# Patient Record
(demographics unavailable — no encounter records)

---

## 2024-10-17 NOTE — ASSESSMENT
[FreeTextEntry1] : 3 year male with dysphagia, OME, and ATH with snoring.    Referral to GI for dysphagia. may have EoE and may need endoscopy.  Snoring and ATH on exam.  Discussed snoring vs UARS vs SDB vs ANDRZEJ.  Discussed that primary snoring is not harmful in and off itself but sleep apnea is different.  Often if we suspect SDB or ANDRZEJ, we recommend evaluating and treating due to long term risk of quality of life issues, learning issues and, in severe cases, heart and lung problems.  Given current SDB symptoms and patient otherwise healthy would recommend considering adenotonsillectomy.  Discussed ANDRZEJ and risks, alternatives, and benefits of adenotonsillectomy including observation or CPAP.  Risks of adenotonsillectomy discussed including, but not limited to, bleeding, infection, scarring, voice changes, pain, dehydration, persistence of sleep apnea, and regrowth of adenoids.  Briefly discussed risk of anesthesia but they will discuss more in depth with the anesthesiologist the day of the procedure.  Parent agreed to proceed to surgery and this will be scheduled accordingly.  History of ear fluid.  Discussed options including ear tubes versus observation and conservative therapy.  Discussed risks, benefits, and alternatives of ear tube placement including, but not limited to, bleeding, scarring, TM perforation, early extrusion, late extrusion, or need for further operation. We briefly discussed the risk of anesthesia. At this point family wishes to proceed with ear tube placement. Repeat audio after surgery.   Discussed at length that ear fluid itself is a result of a mechanical problem due to swelling and inflammation after URIs and that if not infected fluid that we often don't treat with antibiotics.  The underlying issues is eustachian tube dysfunction which can be transient in which we just wait for viral illnesses to run their course.  If the ETD is chronic that is when we discuss possible ear tubes.  Unfortunately there is no good evidence about medications to help improve transient ETD but some have tried nasal sprays including steroids and allergy meds.  Discussed that when they have ear fluid during a URI we recommend waiting 2-3 days and treat supportively and with tylenol or motrin. If the infections persists past that time, can consider oral abx.  Ear tubes in this setting simply bypass the eustachian tube allowing it time to improve function on its own.  The hope is that fewer ear infections and not needing oral abx for ear infections with ear tubes in place (just ear drops).  OME today and previously.  Audio with normal SF. continue feeding and speech therapy.    Would plan for T&A and BMT.  Will await GI eval to see if EGD indicated and coordinate.

## 2024-10-17 NOTE — PHYSICAL EXAM
Detail Level: Detailed [3+] : 3+ [Normal Gait and Station] : normal gait and station [Normal muscle strength, symmetry and tone of facial, head and neck musculature] : normal muscle strength, symmetry and tone of facial, head and neck musculature [Normal] : no cervical lymphadenopathy [Exposed Vessel] : left anterior vessel not exposed [Increased Work of Breathing] : no increased work of breathing with use of accessory muscles and retractions [de-identified] : retracted [de-identified] : OME

## 2024-10-17 NOTE — HISTORY OF PRESENT ILLNESS
[Snoring] : snoring [Tiredness/Fatigue] : tiredness/fatigue [Hyperactivity] : hyperactivity [Dysphagia] : dysphagia [Recurrent Ear Infections] : recurrent ear infections [No Personal or Family History of Easy Bruising, Bleeding, or Issues with General Anesthesia] : No Personal or Family History of easy bruising, bleeding, or issues with general anesthesia [de-identified] : 10-17-24 3 year M with dysphagia and speech delay Has CDE scheduled on Nov 11 Seen by Dr. Underwood who had previously discussed BMT and adenoidectomy due to ETD and SDB symptoms Has issues with choking on solids only, no issues with thins Will not tolerate meats only breads, gaining weight No history of PNA or hospitalizations for respiratory infections No prior GI evaluation  +Nasal congestion Tried nasal steroid without relief +Snoring, daytime tiredness, ADHD concerns, open mouth breathing, restless No prior PSG  2 ear infections in the last six months, most recent Aug 5 infections for the whole year No otorrhea Passed [facility] Getting speech therapy with good progress No recent throat infections No bleeding or anesthesia issues

## 2024-10-17 NOTE — DATA REVIEWED
[FreeTextEntry1] : Audiogram was ordered due to concerns for possible ETD I personally reviewed and interpreted the audiogram. I explained the results of the audiogram to the family. Tymps:  Type B/C Audio: SFs -2kHz, SDT 20

## 2024-11-11 NOTE — ASSESSMENT
[FreeTextEntry1] : PEDIATRIC CLINICAL SWALLOW EVALUATION  Type of Evaluation & Procedure Code: Evaluation of Oral and Pharyngeal Swallow CPT 42868   Patient Name: Dany Marshall  Date of Exam:2024  YOB: 2021  Referring Physician: Dr. Beverly Irizarry   Referring Physician Specialty: Otolaryngology   Medical Diagnosis: Dysphagia, unspecified type (R13.10)  Treatment Diagnosis: Oropharyngeal dysphagia (R13.12)  Date of onset: 6 months of age    REASON FOR REFERRAL:  Dany Marshall, a 3-year-old male, was referred for a Clinical Swallow Evaluation to assess oropharyngeal swallow function due to reported choking episodes, requiring the Heimlich maneuver with solid foods. Patient was accompanied to the evaluation by his mother and grandmother, who provided the case history information, and served as reliable informants.   Current feeding complaints reported by mother included:   -Presence of choking during feeding with solid foods  -Evidence of color change during feeding/while choking on solid foods.    BIRTH & MEDICAL HISTORY: Birth and Medical history gathered via parent interview and through review of electronic medical record. Patient was born at term via emergency  delivery due to fetal hypoxia.   Medical history is significant for developmental delay, chronic nasal congestion, dysphagia, and adenotonsillar hypertrophy. Patient is followed by GI and ENT. Per most recent ENT note dated 10/17/2024: "Epiglottis crisp, FVF and TVF without lesion. TVF fully mobile." No surgical history or hospitalizations reported.    Current Respiratory Status: Room Air  Medications: current medication use denied  Medical allergies: No known Medical allergies reported  Food allergies: No known food allergies reported  Food intolerances: No known food intolerances reported   THERAPEUTIC HISTORY:  Patient reportedly receives speech therapy 1x per week through school-based services.    Results of Previous Modified Barium Swallow Study (MBSS)/Videofluoroscopic Swallow Studies (VFSS):  None reported   Results of Previous Clinical swallow evaluations: None reported   FEEDING HISTORY:  Current Diet (based on the International Dysphagia Diet Standardization initiative [IDDSI]):  Exclusive oral feedings of age-appropriate solids and thin liquids   Feeding Method: Independent in self-feeding   Reported Endurance During Meals: Good   Feeding utensils: Straw, open cup, and milk from a bottle with standard nipple 2x per day  Feeding schedule: 3 meals per day and snacks between Temperature preference: No preference reported  Length of time taken to complete a feedin-20 minutes  History of feeding tube: None reported   Per report, patient is a "picky eater." Patient primarily prefers to eat pizza, bread, crackers, and a variety of fruits. Patient drinks from age-appropriate feeding utensils throughout the day, however drinks milk from a standard nipple 2x per day in the morning and at night. Mother has no concerns for liquids and denied recent upper respiratory infections/pneumonias.  Patient has required the Heimlich Maneuver multiple times while eating solids and turned blue.  Patient reportedly "chokes daily."    The Pediatric Eating Assessment Tool (PediEAT)   The Pediatric Eating Assessment Tool (PediEAT) was completed patient's caregiver to describe the child's typical eating patterns in a patient ranging in 6 months of age to 7 years of age who is being offered some solid foods. Scores are  obtained from four subscales: Physiologic Symptoms, Problematic Mealtime Behaviors, Selective/Restrictive Eating, Oral Processing yielding a Total Score with lower scores indicating lower skill and higher scores indicating higher skill. Scores are grouped in three categories <90th%=No Concern, 90th-95th%= Concern, and >95th%+ High Concern (Bob et al., 2014).  The PediEAT was utilized as an accompaniment to the clinical assessment completed by the provider today.     The PediEAT yielded the following:  Physiologic Symptoms: 34 (High concern)   Problematic Mealtime Behaviors: 54 (Concern)   Selective/Restrictive Eatin (No concern)   Oral Processin (No concern)   Total Score: 115 (High concern)    Interpretation: Caregiver responses of the PediEAT yielded high concern for patient's typical eating patterns.    ASSESSMENT:  Mental Status: Alert, responsive, and cooperative  Mobility Status: Ambulatory   POSTURAL CONTROL & MOVEMENT PATTERN:  Head Control: Within functional limits   Trunk Control: Within functional limits   Involuntary movement: Not observed      ORAL MOTOR ASSESSMENT:  A cursory oral mechanism examination of was conducted   Patient presented with facial symmetry and a predominantly closed mouth posture while at rest.   Dentition: Within functional limits for age   Oral Mucosa: Moist   Buccal: Within functional limits   Palate: Within functional limits    Velar function: intact  Labial: Adequate retraction, adequate lip rounding and closure appreciated   Lingual:  Adequate strength, tone and range of motion   Testing Tongue Position: Within functional limits  Oral Sensory: Within functional limits   Vocal Quality was perceptually judged to be within functional limits based on conversation   Gag reflex: At this time, clinician did not elicit gag reflex.   ASSESSMENT:  The patient was assessed sitting at a child's table  Patient's caregiver was present and they did not serve as the feeder during today's exam. Patient self-fed trials.   Respiratory Status during Evaluation: Room Air   Secretion Management: Within functional limits  There was no coughing, no throat clearing, and no wet/gurgly vocal quality prior to PO administration. Nasal congestion prior to PO trials. Per report, this is consistent with baseline.   The patient was alert and cooperative.   Feeding Assessment:    Solids Trials:  Consistencies Administered:  Pureed (Level 4) via teaspoon   Age-appropriate solids   Feeding Method: Self-fed   Endurance during meal/trials: Good   Oral Preparatory Stage for Solids:  Patient's oral preparatory phase was marked by adequate acceptance as he willingly self-fed trials. Age-appropriate spoon feeding skill appreciated for puree. Age appropriate mastication skill appreciated and marked by adequate bite, lateralization of bolus to molars and demonstration of rotary chew pattern. Cohesive bolus formation appreciated across all trials. Please note, patient attempted to place large bolus into oral cavity 1x;  self-removed following verbal cue from mother and clinician Per report, this is not consistent with baseline behavior. Mother stated patient rarely overstuffs his oral cavity.     Oral Phase for Solids:  Patient's oral phase was marked by coordinated oral motor movements. Adequate anterior posterior movement of bolus, timely oral transit time, and adequate clearance of bolus from oral cavity appreciated.    Pharyngeal Stage: Solids   Within functional limits: Yes   Initiation of swallow based on digital palpation and judged to be: Within functional limits   Hyo-laryngeal elevation based on digital palpation: Within functional limits   Overt signs and symptoms of penetration/aspiration: Not observed   Cardiopulmonary changes: NO cyanosis observed.   Fluid Trials:  Consistencies Administered:   Thin (Level 0) via small juice box straw and open cup   Feeding Method: Self-fed   Endurance during meal/trials: Good   Oral Preparatory Stage for Fluids  Patient's oral preparatory phase was marked by adequate acceptance, awareness of volume size and cohesive bolus formation.  Adequate oral grading to open cup appreciated. Age appropriate straw drinking skill marked by adequate ability to create and maintain intraoral gradient pressure for fluid expression upward in straw    Oral Stage for Fluids:  Patient's oral stage was marked by coordinated lingual movements as patient was noted with adequate control and containment of single and consecutive sips of thin liquids from both the straw and open cup. Adequate anterior posterior movement of bolus, timely oral transit time, and adequate clearance of bolus from oral cavity appreciated.    Pharyngeal Stage: Fluids  Within functional limits: Yes   Initiation of swallow based on digital palpation and judged to be: Within functional limits   Hyo-laryngeal elevation based on digital palpation: Within functional limits   Overt signs and symptoms of penetration/aspiration: Not observed  Cardiopulmonary Changes: Not observed   PROGNOSIS:  Prognosis is good for safe and adequate oral intake of the recommended consistencies as outlined below, based on the results of today's assessment and the medical history reported.   EDUCATION:   Discussed results of evaluation with patient's caregivers    Patient's caregivers expressed understanding of evaluation and agreement with goals and treatment plan  Patient's caregivers expressed understanding of safety precautions/feeding recommendations  Provided written recommendations   Provided soft and bite-sized (IDDSI Level 6) handout  Provided modified barium swallow study scheduling sheet    IMPRESSIONS: Dany Marshall, a 3-year-old male, was referred for a Clinical Swallow Evaluation to assess oropharyngeal swallow function due to reported choking episodes, requiring the Heimlich maneuver with solid foods. Patient with functional oral stage for liquids as he was noted with adequate control and containment of single and consecutive sips of thin liquids, however please note patient with immature feeding skill as he accepts milk from a bottle 2x per day.  For solid trials, patient noted with age-appropriate spoon feeding skill and mastication pattern. Adequate preparation, manipulation and oral clearance appreciated for age-appropriate solids.  Patient attempted to overstuff oral cavity 1x requiring cues to remove bolus from oral cavity. This was not replicated for trials in which patient demonstrated appropriate bite sizes. Parent counseled and educated on supervision with meal times, and importance of appropriate bolus sizes. Pharyngeal stage notable for timely pharyngeal swallow initiation and adequate hyolaryngeal elevation/excursion based on digital palpation. NO overt signs or symptoms of aspiration noted for thin liquids, puree, and age-appropriate solids. No cardiopulmonary changes observed. Vocal quality remained consistent pre- and post- trials based on conversation. Given parent report of choking with solid foods, and caregiver responses of the PediEAT qualitative questionnarie patient would benefit from soft and bite-sized diet with the following safe feeding strategies listed below, and modified barium swallow study to objectively assess pharyngeal stage.   DIET/FLUID RECOMMENDED CONSISTENCIES: Initiate soft and bite sized solids (Level 6) and continue thin liquids (Level 0)    Safety precautions swallowing/recommendations:  -Strict 1:1 supervision with mealtimes   -Verbal cues for small bites   ADDITIONAL RECOMMENDATIONS:  -Initiate feeding and swallowing therapy through school district/community based services addressing age appropriate feeding skills and reported picky eating. Services offered at this center, however parents declined with preference to seek therapeutic intervention closer to home.   -Modified Barium Swallow Study/Videofluoroscopic Swallow Study (MBS/VFSS) is recommended secondary to multiple choking episodes.   -Follow up with established providers as scheduled   -Monitor for signs and symptoms of aspiration and or laryngeal penetration, such as change of breathing pattern, cough, throat clearing, gurgly/wet voice, color change, fever, pneumonia, and upper respiratory infection. If noted: Discontinue oral intake, provide non-oral nutrition/hydration/meds, and contact physician immediately.   This referral process was reviewed with the parent. No further recommendations were made at this time. Please feel free to contact the Center at [phone], if any additional information is needed.   Jeannine Mullins M.A., CCC-SLP  [facility]#[id number]    References  RHEA Rojas., LADONNA Peck., WICHO Lindsey., ANITRA Tsai., KENISHA Stanley., BRAYAN Elkins., HUDSON Osorio, and DARYL Nicole. (2014). Development and content validation of the Pediatric Eating Assessment Tool (Pedi-Eat). [employer] Journal of Speech-Language pathology, 23, 1-14. Doi: [id number] ()

## 2024-11-11 NOTE — REASON FOR VISIT
[Initial] : initial visit for [Clinical Swallow] : clinical swallow evaluation [FreeTextEntry1] : Dr. Beverly Irizarry

## 2025-01-17 NOTE — HISTORY OF PRESENT ILLNESS
[Snoring] : snoring [Tiredness/Fatigue] : tiredness/fatigue [Hyperactivity] : hyperactivity [Dysphagia] : dysphagia [Recurrent Ear Infections] : recurrent ear infections [No Personal or Family History of Easy Bruising, Bleeding, or Issues with General Anesthesia] : No Personal or Family History of easy bruising, bleeding, or issues with general anesthesia [de-identified] : 1-17-25 speech eval 11/11/24 - currently in speech therapy Complaining of right otalgia - he's often sticking his fingers inside No drainage or fevers URI 2 weeks ago No concerns for hearing loss Swallowing has been stable, gaining weight. not in feeding therapy. choking on solids not liquids. Pending EGD by GI.  Choking less than last visit but does continue to happen with solids  Snoring at night - sleep study pending for february Constant nasal congestion, no use of nasal sprays  10-17-24 3 year M with dysphagia and speech delay Has CDE scheduled on Nov 11 Seen by Dr. Underwood who had previously discussed BMT and adenoidectomy due to ETD and SDB symptoms Has issues with choking on solids only, no issues with thins Will not tolerate meats only breads, gaining weight No history of PNA or hospitalizations for respiratory infections No prior GI evaluation  +Nasal congestion Tried nasal steroid without relief +Snoring, daytime tiredness, ADHD concerns, open mouth breathing, restless No prior PSG  2 ear infections in the last six months, most recent Aug 5 infections for the whole year No otorrhea Passed [facility] Getting speech therapy with good progress No recent throat infections No bleeding or anesthesia issues

## 2025-01-17 NOTE — PHYSICAL EXAM
[3+] : 3+ [Normal Gait and Station] : normal gait and station [Normal muscle strength, symmetry and tone of facial, head and neck musculature] : normal muscle strength, symmetry and tone of facial, head and neck musculature [Normal] : no cervical lymphadenopathy [Exposed Vessel] : left anterior vessel not exposed [Increased Work of Breathing] : no increased work of breathing with use of accessory muscles and retractions [de-identified] : OME [de-identified] : OME

## 2025-01-17 NOTE — ASSESSMENT
[FreeTextEntry1] : 3 year male with dysphagia, OME, and ATH with snoring.    Planning EGD with GI but was supposed to be scheduled for T&A and EGD and BMT all at the same time. Pending PSG in February. At this point wait till PSG and then plan surgery based on results.    Recommend feeding therapy.   History of ear fluid.  Discussed options including ear tubes versus observation and conservative therapy.  Discussed risks, benefits, and alternatives of ear tube placement including, but not limited to, bleeding, scarring, TM perforation, early extrusion, late extrusion, or need for further operation. We briefly discussed the risk of anesthesia. At this point family wishes to proceed with ear tube placement. Repeat audio after surgery.   Discussed at length that ear fluid itself is a result of a mechanical problem due to swelling and inflammation after URIs and that if not infected fluid that we often don't treat with antibiotics.  The underlying issues is eustachian tube dysfunction which can be transient in which we just wait for viral illnesses to run their course.  If the ETD is chronic that is when we discuss possible ear tubes.  Unfortunately there is no good evidence about medications to help improve transient ETD but some have tried nasal sprays including steroids and allergy meds.  Discussed that when they have ear fluid during a URI we recommend waiting 2-3 days and treat supportively and with tylenol or motrin. If the infections persists past that time, can consider oral abx.  Ear tubes in this setting simply bypass the eustachian tube allowing it time to improve function on its own.  The hope is that fewer ear infections and not needing oral abx for ear infections with ear tubes in place (just ear drops).  OME today and previously.  Audio with normal SF. continue feeding and speech therapy.    Scheudled for surgery in March and Feb PSG. Will check PSG and call mom  Plan OR Tonsillectomy and Adenoidectomy (84776) BMT (20560-76) EGD with GI [facility] main

## 2025-01-17 NOTE — DATA REVIEWED
[FreeTextEntry1] : Audiogram was ordered due to concerns for possible ETD I personally reviewed and interpreted the audiogram. I explained the results of the audiogram to the family. Tymps:  Type B bilaterally Audio: SFs -4kHz, SDT 20

## 2025-05-09 NOTE — HISTORY OF PRESENT ILLNESS
[Snoring] : snoring [Tiredness/Fatigue] : tiredness/fatigue [Hyperactivity] : hyperactivity [Dysphagia] : dysphagia [Recurrent Ear Infections] : recurrent ear infections [No Personal or Family History of Easy Bruising, Bleeding, or Issues with General Anesthesia] : No Personal or Family History of easy bruising, bleeding, or issues with general anesthesia [de-identified] : 5/9/25 4 year old male here for post operative visit s/p bilateral myringotomy with tube placement. Tonsillectomy and adenoidectomy on 3/7/25. Mother reports speech picking up. Receives speech therapy three times a week. Hearing improved as well. Denies otorrhea, otalgia, or recent ear infections. No more snoring or pauses.  Eating and drinking at baseline - maintaining weight.  No fevers or bleeding post operatively.  No other concerns.   1-17-25 speech eval 11/11/24 - currently in speech therapy Complaining of right otalgia - he's often sticking his fingers inside No drainage or fevers URI 2 weeks ago No concerns for hearing loss Swallowing has been stable, gaining weight. not in feeding therapy. choking on solids not liquids. Pending EGD by GI.  Choking less than last visit but does continue to happen with solids  Snoring at night - sleep study pending for february Constant nasal congestion, no use of nasal sprays  10-17-24 3 year M with dysphagia and speech delay Has CDE scheduled on Nov 11 Seen by Dr. Underwood who had previously discussed BMT and adenoidectomy due to ETD and SDB symptoms Has issues with choking on solids only, no issues with thins Will not tolerate meats only breads, gaining weight No history of PNA or hospitalizations for respiratory infections No prior GI evaluation  +Nasal congestion Tried nasal steroid without relief +Snoring, daytime tiredness, ADHD concerns, open mouth breathing, restless No prior PSG  2 ear infections in the last six months, most recent Aug 5 infections for the whole year No otorrhea Passed [facility] Getting speech therapy with good progress No recent throat infections No bleeding or anesthesia issues

## 2025-05-09 NOTE — DATA REVIEWED
[FreeTextEntry1] :   The relevant medical records as well as any testing, imaging, and other order results were independently reviewed and interpreted by me and discussed with family.  Parent used as independent historian given patient age and maturity.  reviewed operative record  Audiogram Indications  An audiogram was ordered to evaluate for eustachian tube dysfunction and/or conductive or sensorineural hearing loss given current symptoms/findings. Findings: Tymps: Patent PETs Audio: SFs -4kHz The above results were independently interpreted by me and discussed with the family.

## 2025-05-09 NOTE — ASSESSMENT
[FreeTextEntry1] : 4 year male s/p ear tubes.  TIPP and audio c/w normal hearing.  Discussed will monitor tubes until they come out on their own usually about 8-18 months. Will monitor hearing.  Any ear infections no longer need oral abx and can be treated with ear drops alone.  Continue speech therapy if indicated.    Floxin gtts Rx sent for any AOM per family request.  Discussed potential risks and benefits and alternatives to the use of this medication and discussed the dosing and administration of this medication.  Also s/p tonsillectomy and adenoidectomy. Discussed that adenoids can grow back and that we will monitor for now.  Any signs of recurrent nasal congestion or snoring they should let us know.  Tonsils do not grow back.  If persistent snoring sometimes will get repeat PSG.  Monitor for now.    RTC 6 months with audio  During this encounter I have reviewed prior external notes, reviewed and independently interpreted any labs, imaging, and other testing ordered by myself or other clinicians.  I have discussed with the family any medications and their potential side effects, any testing or imaging that has been ordered and their potential findings, as well as discussed any potential surgical interventions with their respective risks, benefits, and alternatives.

## 2025-05-09 NOTE — PHYSICAL EXAM
[Normal Gait and Station] : normal gait and station [Normal muscle strength, symmetry and tone of facial, head and neck musculature] : normal muscle strength, symmetry and tone of facial, head and neck musculature [Normal] : no cervical lymphadenopathy [Placement/Patency] : tympanostomy tube in place and patent [Surgically Absent] : surgically absent [Exposed Vessel] : left anterior vessel not exposed [Increased Work of Breathing] : no increased work of breathing with use of accessory muscles and retractions